# Patient Record
Sex: FEMALE | Race: BLACK OR AFRICAN AMERICAN | Employment: FULL TIME | URBAN - METROPOLITAN AREA
[De-identification: names, ages, dates, MRNs, and addresses within clinical notes are randomized per-mention and may not be internally consistent; named-entity substitution may affect disease eponyms.]

---

## 2017-10-23 ENCOUNTER — HOSPITAL ENCOUNTER (OUTPATIENT)
Dept: MAMMOGRAPHY | Age: 52
Discharge: HOME | End: 2017-10-23
Admitting: INTERNAL MEDICINE

## 2017-10-23 DIAGNOSIS — N63.0 BREAST LUMP: ICD-10-CM

## 2017-10-23 DIAGNOSIS — N63.20 LEFT BREAST LUMP: ICD-10-CM

## 2017-10-23 DIAGNOSIS — N63.0 LUMP OR MASS IN BREAST: ICD-10-CM

## 2018-07-06 ENCOUNTER — OFFICE VISIT (OUTPATIENT)
Dept: DERMATOLOGY | Age: 53
End: 2018-07-06

## 2018-07-06 DIAGNOSIS — L90.5 CICATRIX: ICD-10-CM

## 2018-07-06 DIAGNOSIS — Z78.9 NON-TOBACCO USER: ICD-10-CM

## 2018-07-06 DIAGNOSIS — L70.0 ACNE VULGARIS: Primary | ICD-10-CM

## 2018-07-06 PROCEDURE — 99202 OFFICE O/P NEW SF 15 MIN: CPT | Performed by: DERMATOLOGY

## 2018-07-06 RX ORDER — SODIUM SULFACETAMIDE AND SULFUR 80; 40 MG/ML; MG/ML
SOLUTION TOPICAL
Qty: 473 ML | Refills: 2 | Status: SHIPPED | OUTPATIENT
Start: 2018-07-06

## 2018-07-06 RX ORDER — ONDANSETRON HYDROCHLORIDE 8 MG/1
8 TABLET, FILM COATED ORAL
COMMUNITY
Start: 2017-06-29

## 2018-07-06 RX ORDER — ADAPALENE AND BENZOYL PEROXIDE .1; 2.5 G/100G; G/100G
GEL TOPICAL
Qty: 45 G | Refills: 1 | Status: SHIPPED | OUTPATIENT
Start: 2018-07-06

## 2018-07-26 ENCOUNTER — TELEPHONE (OUTPATIENT)
Dept: DERMATOLOGY | Age: 53
End: 2018-07-26

## 2018-07-26 NOTE — TELEPHONE ENCOUNTER
Voicemail message received Thursday, 7/25/18    States has not received her mail order script yet. There may be a mixup with her last name. States we had her listed under her maiden name of Merna Maldonado and now it should be [place]. Could we please look into this and let her know.

## 2018-10-19 ENCOUNTER — OFFICE VISIT (OUTPATIENT)
Dept: DERMATOLOGY | Age: 53
End: 2018-10-19
Payer: COMMERCIAL

## 2018-10-19 DIAGNOSIS — L85.3 XEROSIS CUTIS: ICD-10-CM

## 2018-10-19 DIAGNOSIS — L70.0 ACNE VULGARIS: Primary | ICD-10-CM

## 2018-10-19 DIAGNOSIS — D22.9 MULTIPLE BENIGN NEVI: ICD-10-CM

## 2018-10-19 DIAGNOSIS — L91.8 ACROCHORDON: ICD-10-CM

## 2018-10-19 DIAGNOSIS — L81.0 POST-INFLAMMATORY HYPERPIGMENTATION: ICD-10-CM

## 2018-10-19 PROCEDURE — 99213 OFFICE O/P EST LOW 20 MIN: CPT | Performed by: DERMATOLOGY

## 2018-10-19 NOTE — PROGRESS NOTES
[facility]) Dermatology  [place], [place], [address]       Eulalia Kimble  1965    48 y.o. female     Date of Visit: 10/19/2018    Chief Complaint:   Chief Complaint   Patient presents with    Skin Exam    Acne     epiduo- pt states better- does not us med every day - would like a cream to help fade spots    Lesion(s)     lower back, right eyelid        I was asked to see this patient by Dr. Rodriguez ref. provider found. History of Present Illness:  Zoya Treviño is a 48 y.o. female who presents with the chief complaint of the followin. TBSE. Many year history of multiple nevi on the trunk and extremities, all present for many years. Denies new moles. Denies moles changing in size, shape, color. None associated w/ pain, bleeding, pruritus. 2. History of acne to her face. Patient has been applying Epiduo every other night and tolerating well. Has noticed improvement in her acne overall and has been several weeks since a pimple appeared. Does have some asymptomatic discoloration in areas of prior acne bumps on face. Would like a medication to fade these areas. 3.  Complains of a skin tag to right upper eyelid, present for several years, unsure if changed in size. she denies associated burning, bleeding, pain, or itch. 4. Complains of dry rough feeling hands over the last 3 weeks as weather has changed outside. Does not use gentle cleansers or moisturize regularly. Does wash hands frequently. Review of Systems:  Constitutional: Reports general sense of well-being   Skin: No new or changing moles, no history of keloids or hypertrophic scars, no interval of severe sunburns  Heme: No abnormal bruising or bleeding. Past Medical History, Family History, Surgical History, Medications and Allergies reviewed.     Past Skin Hx:   Patient denies past history of melanoma, NMSC, dysplastic nevi, or chronic skin rashes.     PFHx: Denies hx of MM or NMSC     PMHx: papillary thyroid cancer, follicular variant    History reviewed. No pertinent family history. Past Medical History:   Diagnosis Date    Cancer ([address]     thyroid    Thyroid disease      Past Surgical History:   Procedure Laterality Date    HERNIA REPAIR      HYSTERECTOMY      THYROID SURGERY      UMBILICAL HERNIA REPAIR         No Known Allergies  Outpatient Prescriptions Marked as Taking for the 10/19/18 encounter (Office Visit) with [place] Damari, DO   Medication Sig Dispense Refill    calcium carbonate-vitamin D (CALTRATE) 600-400 MG-UNIT TABS per tab Take 1 tablet by mouth      ondansetron (ZOFRAN) 8 MG tablet Take 8 mg by mouth      Carbamide Peroxide (EAR DROPS OT) Place in ear(s)      Adapalene-Benzoyl Peroxide (EPIDUO) 0.1-2.5 % GEL Apply thin layer to face every third night, increasing to every other night as tolerated, moisturize nightly 45 g 1    Sulfacetamide Sodium-Sulfur (SULFACLEANSE 8/4) 8-4 % SUSP Apply Body wash neck down every other day, leave on for 5 minutes before rinsing off 473 mL 2    levothyroxine (SYNTHROID) 75 MCG tablet Take 75 mcg by mouth Daily         Social History:   Social History     Social History    Marital status: Single     Spouse name: N/A    Number of children: N/A    Years of education: N/A     Occupational History    Not on file. Social History Main Topics    Smoking status: Never Smoker    Smokeless tobacco: Never Used    Alcohol use No    Drug use: No    Sexual activity: Not on file     Other Topics Concern    Not on file     Social History Narrative    No narrative on file       Physical Examination     The following were examined and determined to be normal: Psych/Neuro, Scalp/hair, Head/face, Conjunctivae/eyelids, Gums/teeth/lips, Neck, Breast/axilla/chest, Abdomen, Back, RLE and LLE. Groin/buttocks. Genitalia not examined. The following were examined and determined to be abnormal: RUE, LUE and Nails/digits.      -General: NAD, well-nourished,

## 2018-10-29 ENCOUNTER — HOSPITAL ENCOUNTER (OUTPATIENT)
Dept: MAMMOGRAPHY | Age: 53
Discharge: HOME | End: 2018-10-29
Payer: COMMERCIAL

## 2018-10-29 DIAGNOSIS — Z12.31 VISIT FOR SCREENING MAMMOGRAM: ICD-10-CM

## 2018-10-29 PROCEDURE — 77063 BREAST TOMOSYNTHESIS BI: CPT

## 2019-11-21 ENCOUNTER — HOSPITAL ENCOUNTER (OUTPATIENT)
Dept: MAMMOGRAPHY | Age: 54
Discharge: HOME | End: 2019-11-21
Payer: COMMERCIAL

## 2019-11-21 DIAGNOSIS — Z12.31 VISIT FOR SCREENING MAMMOGRAM: ICD-10-CM

## 2019-11-21 PROCEDURE — 77063 BREAST TOMOSYNTHESIS BI: CPT

## 2019-11-26 ENCOUNTER — HOSPITAL ENCOUNTER (OUTPATIENT)
Dept: ULTRASOUND IMAGING | Age: 54
Discharge: HOME | End: 2019-11-26
Payer: COMMERCIAL

## 2019-11-26 DIAGNOSIS — N63.0 BREAST LUMP: ICD-10-CM

## 2019-11-26 PROCEDURE — 76642 ULTRASOUND BREAST LIMITED: CPT

## 2020-06-26 ENCOUNTER — OFFICE VISIT (OUTPATIENT)
Dept: DERMATOLOGY | Age: 55
End: 2020-06-26
Payer: COMMERCIAL

## 2020-06-26 VITALS — TEMPERATURE: 97.7 F

## 2020-06-26 PROCEDURE — 11200 RMVL SKIN TAGS UP TO&INC 15: CPT | Performed by: DERMATOLOGY

## 2020-06-26 NOTE — PATIENT INSTRUCTIONS
Sun Protection Tips    Apply broad spectrum water resistant sunscreen with an SPF of at least 30 to exposed areas of the skin. Dont forget the ears and lips! Remember to reapply sunscreen about every 2 hours and after swimming or sweating. Wear sun protective clothing. Swim shirts (aka. rash guards) are a great idea and negates the need to reapply sunscreen in those areas. Seek the shade whenever possible especially between the hours of 10am and 4pm when the suns rays are the strongest.     Avoid tanning beds          Wear a wide brim hat while in the sun    Wound Care Instructions   Cleanse the wound with mild soapy water daily.  Gently dry the area.  Apply Vaseline or petroleum jelly to the wound using a cotton tipped applicator or Qtip.  Cover with a clean bandage.  Repeat this process until the biopsy site is healed.  You may shower and bathe as usual.       Thanks for your visit! Feel free to call Dr. Myriam Zhang assistantKatie if you have questions, concerns or to schedule your cosmetic procedures.

## 2020-06-26 NOTE — PROGRESS NOTES
Place in ear(s)      Adapalene-Benzoyl Peroxide (EPIDUO) 0.1-2.5 % GEL Apply thin layer to face every third night, increasing to every other night as tolerated, moisturize nightly 45 g 1    Sulfacetamide Sodium-Sulfur (SULFACLEANSE 8/4) 8-4 % SUSP Apply Body wash neck down every other day, leave on for 5 minutes before rinsing off 473 mL 2    levothyroxine (SYNTHROID) 75 MCG tablet Take 75 mcg by mouth Daily         Social History:   Social History     Socioeconomic History    Marital status:      Spouse name: Not on file    Number of children: Not on file    Years of education: Not on file    Highest education level: Not on file   Occupational History    Not on file   Social Needs    Financial resource strain: Not on file    Food insecurity     Worry: Not on file     Inability: Not on file    Transportation needs     Medical: Not on file     Non-medical: Not on file   Tobacco Use    Smoking status: Never Smoker    Smokeless tobacco: Never Used   Substance and Sexual Activity    Alcohol use: No    Drug use: No    Sexual activity: Not on file   Lifestyle    Physical activity     Days per week: Not on file     Minutes per session: Not on file    Stress: Not on file   Relationships    Social connections     Talks on phone: Not on file     Gets together: Not on file     Attends [employer]ism service: Not on file     Active member of club or organization: Not on file     Attends meetings of clubs or organizations: Not on file     Relationship status: Not on file    Intimate partner violence     Fear of current or ex partner: Not on file     Emotionally abused: Not on file     Physically abused: Not on file     Forced sexual activity: Not on file   Other Topics Concern    Not on file   Social History Narrative    Not on file       Physical Examination     The following were examined and determined to be normal: Psych/Neuro.     The following were examined and determined to be abnormal:

## 2020-12-29 ENCOUNTER — HOSPITAL ENCOUNTER (OUTPATIENT)
Dept: MAMMOGRAPHY | Age: 55
Discharge: HOME | End: 2020-12-29
Payer: COMMERCIAL

## 2020-12-29 PROCEDURE — 77063 BREAST TOMOSYNTHESIS BI: CPT

## 2022-01-22 ENCOUNTER — HOSPITAL ENCOUNTER (OUTPATIENT)
Dept: MAMMOGRAPHY | Age: 57
Discharge: HOME | End: 2022-01-22
Payer: COMMERCIAL

## 2022-01-22 VITALS — WEIGHT: 194 LBS | BODY MASS INDEX: 30.45 KG/M2 | HEIGHT: 67 IN

## 2022-01-22 DIAGNOSIS — Z12.31 VISIT FOR SCREENING MAMMOGRAM: ICD-10-CM

## 2022-01-22 PROCEDURE — 77063 BREAST TOMOSYNTHESIS BI: CPT

## 2023-02-27 ENCOUNTER — HOSPITAL ENCOUNTER (OUTPATIENT)
Dept: MAMMOGRAPHY | Age: 58
Discharge: HOME | End: 2023-02-27
Payer: COMMERCIAL

## 2023-02-27 VITALS — WEIGHT: 194 LBS | HEIGHT: 66 IN | BODY MASS INDEX: 31.18 KG/M2

## 2023-02-27 DIAGNOSIS — Z12.31 VISIT FOR SCREENING MAMMOGRAM: ICD-10-CM

## 2023-02-27 PROCEDURE — 77063 BREAST TOMOSYNTHESIS BI: CPT

## 2024-06-08 ENCOUNTER — HOSPITAL ENCOUNTER (EMERGENCY)
Age: 59
Discharge: LEFT BEFORE DISCHARGE | End: 2024-06-08
Attending: EMERGENCY MEDICINE
Payer: COMMERCIAL

## 2024-06-08 ENCOUNTER — APPOINTMENT (OUTPATIENT)
Dept: GENERAL RADIOLOGY | Age: 59
End: 2024-06-08
Payer: COMMERCIAL

## 2024-06-08 VITALS
BODY MASS INDEX: 31.84 KG/M2 | RESPIRATION RATE: 16 BRPM | HEIGHT: 66 IN | HEART RATE: 66 BPM | DIASTOLIC BLOOD PRESSURE: 98 MMHG | OXYGEN SATURATION: 99 % | TEMPERATURE: 98.3 F | SYSTOLIC BLOOD PRESSURE: 165 MMHG | WEIGHT: 198.1 LBS

## 2024-06-08 DIAGNOSIS — I21.4 NSTEMI (NON-ST ELEVATED MYOCARDIAL INFARCTION) (HCC): Primary | ICD-10-CM

## 2024-06-08 LAB
ALBUMIN SERPL-MCNC: 4.2 G/DL (ref 3.4–5)
ALP SERPL-CCNC: 82 U/L (ref 40–129)
ALT SERPL-CCNC: 41 U/L (ref 10–40)
ANION GAP SERPL CALCULATED.3IONS-SCNC: 11 MMOL/L (ref 3–16)
APTT BLD: 22.2 SEC (ref 22.1–36.4)
AST SERPL-CCNC: 53 U/L (ref 15–37)
BASOPHILS # BLD: 0 K/UL (ref 0–0.2)
BASOPHILS NFR BLD: 0.5 %
BILIRUB DIRECT SERPL-MCNC: <0.2 MG/DL (ref 0–0.3)
BILIRUB INDIRECT SERPL-MCNC: ABNORMAL MG/DL (ref 0–1)
BILIRUB SERPL-MCNC: 0.3 MG/DL (ref 0–1)
BUN SERPL-MCNC: 12 MG/DL (ref 7–20)
CALCIUM SERPL-MCNC: 9.2 MG/DL (ref 8.3–10.6)
CHLORIDE SERPL-SCNC: 105 MMOL/L (ref 99–110)
CO2 SERPL-SCNC: 24 MMOL/L (ref 21–32)
CREAT SERPL-MCNC: 0.8 MG/DL (ref 0.6–1.1)
D-DIMER QUANTITATIVE: <0.27 UG/ML FEU (ref 0–0.6)
DEPRECATED RDW RBC AUTO: 13.6 % (ref 12.4–15.4)
EKG ATRIAL RATE: 59 BPM
EKG DIAGNOSIS: NORMAL
EKG P AXIS: 46 DEGREES
EKG P-R INTERVAL: 194 MS
EKG Q-T INTERVAL: 398 MS
EKG QRS DURATION: 76 MS
EKG QTC CALCULATION (BAZETT): 394 MS
EKG R AXIS: 24 DEGREES
EKG T AXIS: 56 DEGREES
EKG VENTRICULAR RATE: 59 BPM
EOSINOPHIL # BLD: 0.1 K/UL (ref 0–0.6)
EOSINOPHIL NFR BLD: 2.1 %
GFR SERPLBLD CREATININE-BSD FMLA CKD-EPI: 85 ML/MIN/{1.73_M2}
GLUCOSE SERPL-MCNC: 106 MG/DL (ref 70–99)
HCT VFR BLD AUTO: 43.2 % (ref 36–48)
HGB BLD-MCNC: 14.5 G/DL (ref 12–16)
LIPASE SERPL-CCNC: 31 U/L (ref 13–60)
LYMPHOCYTES # BLD: 1.9 K/UL (ref 1–5.1)
LYMPHOCYTES NFR BLD: 57.9 %
MCH RBC QN AUTO: 29.1 PG (ref 26–34)
MCHC RBC AUTO-ENTMCNC: 33.6 G/DL (ref 31–36)
MCV RBC AUTO: 86.6 FL (ref 80–100)
MONOCYTES # BLD: 0.2 K/UL (ref 0–1.3)
MONOCYTES NFR BLD: 6.4 %
NEUTROPHILS # BLD: 1.1 K/UL (ref 1.7–7.7)
NEUTROPHILS NFR BLD: 33.1 %
PLATELET # BLD AUTO: 179 K/UL (ref 135–450)
PMV BLD AUTO: 9.3 FL (ref 5–10.5)
POTASSIUM SERPL-SCNC: 3.9 MMOL/L (ref 3.5–5.1)
PROT SERPL-MCNC: 7.3 G/DL (ref 6.4–8.2)
RBC # BLD AUTO: 4.99 M/UL (ref 4–5.2)
SODIUM SERPL-SCNC: 140 MMOL/L (ref 136–145)
TROPONIN, HIGH SENSITIVITY: 25 NG/L (ref 0–14)
TROPONIN, HIGH SENSITIVITY: 6 NG/L (ref 0–14)
TROPONIN, HIGH SENSITIVITY: 75 NG/L (ref 0–14)
WBC # BLD AUTO: 3.2 K/UL (ref 4–11)

## 2024-06-08 PROCEDURE — 99285 EMERGENCY DEPT VISIT HI MDM: CPT

## 2024-06-08 PROCEDURE — 80076 HEPATIC FUNCTION PANEL: CPT

## 2024-06-08 PROCEDURE — 36415 COLL VENOUS BLD VENIPUNCTURE: CPT

## 2024-06-08 PROCEDURE — 85730 THROMBOPLASTIN TIME PARTIAL: CPT

## 2024-06-08 PROCEDURE — 83690 ASSAY OF LIPASE: CPT

## 2024-06-08 PROCEDURE — 80048 BASIC METABOLIC PNL TOTAL CA: CPT

## 2024-06-08 PROCEDURE — 85379 FIBRIN DEGRADATION QUANT: CPT

## 2024-06-08 PROCEDURE — 6370000000 HC RX 637 (ALT 250 FOR IP): Performed by: EMERGENCY MEDICINE

## 2024-06-08 PROCEDURE — 84484 ASSAY OF TROPONIN QUANT: CPT

## 2024-06-08 PROCEDURE — 71046 X-RAY EXAM CHEST 2 VIEWS: CPT

## 2024-06-08 PROCEDURE — 85025 COMPLETE CBC W/AUTO DIFF WBC: CPT

## 2024-06-08 PROCEDURE — 93005 ELECTROCARDIOGRAM TRACING: CPT | Performed by: EMERGENCY MEDICINE

## 2024-06-08 RX ORDER — ASPIRIN 81 MG/1
324 TABLET, CHEWABLE ORAL ONCE
Status: COMPLETED | OUTPATIENT
Start: 2024-06-08 | End: 2024-06-08

## 2024-06-08 RX ORDER — LABETALOL HYDROCHLORIDE 5 MG/ML
10 INJECTION, SOLUTION INTRAVENOUS ONCE
Status: DISCONTINUED | OUTPATIENT
Start: 2024-06-08 | End: 2024-06-08 | Stop reason: HOSPADM

## 2024-06-08 RX ORDER — HEPARIN SODIUM 10000 [USP'U]/100ML
5-30 INJECTION, SOLUTION INTRAVENOUS CONTINUOUS
Status: DISCONTINUED | OUTPATIENT
Start: 2024-06-08 | End: 2024-06-08 | Stop reason: HOSPADM

## 2024-06-08 RX ORDER — HEPARIN SODIUM 1000 [USP'U]/ML
2000 INJECTION, SOLUTION INTRAVENOUS; SUBCUTANEOUS PRN
Status: DISCONTINUED | OUTPATIENT
Start: 2024-06-08 | End: 2024-06-08 | Stop reason: HOSPADM

## 2024-06-08 RX ORDER — HEPARIN SODIUM 1000 [USP'U]/ML
4000 INJECTION, SOLUTION INTRAVENOUS; SUBCUTANEOUS ONCE
Status: DISCONTINUED | OUTPATIENT
Start: 2024-06-08 | End: 2024-06-08 | Stop reason: HOSPADM

## 2024-06-08 RX ORDER — HEPARIN SODIUM 1000 [USP'U]/ML
4000 INJECTION, SOLUTION INTRAVENOUS; SUBCUTANEOUS PRN
Status: DISCONTINUED | OUTPATIENT
Start: 2024-06-08 | End: 2024-06-08 | Stop reason: HOSPADM

## 2024-06-08 RX ADMIN — ALUMINUM HYDROXIDE, MAGNESIUM HYDROXIDE, AND SIMETHICONE: 1200; 120; 1200 SUSPENSION ORAL at 11:11

## 2024-06-08 RX ADMIN — ASPIRIN 324 MG: 81 TABLET, CHEWABLE ORAL at 12:33

## 2024-06-08 ASSESSMENT — PAIN SCALES - GENERAL: PAINLEVEL_OUTOF10: 1

## 2024-06-08 ASSESSMENT — PAIN DESCRIPTION - LOCATION: LOCATION: CHEST

## 2024-06-08 NOTE — ED NOTES
ED TO INPATIENT SBAR HANDOFF    Patient Name: Shanice Kimble   :  1965  59 y.o.   MRN:  [id number]  Preferred Name  Shanice   ED Room #:  A05/A05-05  Family/Caregiver Present yes   Restraints no   Sitter no   Sepsis Risk Score Sepsis Risk Score: 0.74    Situation  Code Status: No Order No additional code details.    Allergies: Patient has no known allergies.  Weight: Patient Vitals for the past 96 hrs (Last 3 readings):   Weight   24 0922 89.9 kg (198 lb 1.6 oz)     Arrived from: home  Chief Complaint:   Chief Complaint   Patient presents with    Chest Pain     Patient presents to ED with SOB and chest pain for the past few hours     Hospital Problem/Diagnosis:  Active Problems:    * No active hospital problems. *  Resolved Problems:    * No resolved hospital problems. *    Imaging:   XR CHEST (2 VW)   Final Result   1.  No evidence of acute cardiopulmonary disease.       Electronically signed by Hugo Gates MD        Abnormal labs:   Abnormal Labs Reviewed   CBC WITH AUTO DIFFERENTIAL - Abnormal; Notable for the following components:       Result Value    WBC 3.2 (*)     Neutrophils Absolute 1.1 (*)     All other components within normal limits   BASIC METABOLIC PANEL W/ REFLEX TO MG FOR LOW K - Abnormal; Notable for the following components:    Glucose 106 (*)     All other components within normal limits   TROPONIN - Abnormal; Notable for the following components:    Troponin, High Sensitivity 25 (*)     All other components within normal limits   HEPATIC FUNCTION PANEL - Abnormal; Notable for the following components:    ALT 41 (*)     AST 53 (*)     All other components within normal limits   TROPONIN - Abnormal; Notable for the following components:    Troponin, High Sensitivity 75 (*)     All other components within normal limits     Critical values: no     Abnormal Assessment Findings: Elevated troponin, chest pain    Background  History:   Past Medical History:   Diagnosis Date    Cancer  (HCC)     thyroid    Thyroid disease        Assessment    Vitals/MEWS:        Vitals:    06/08/24 0922 06/08/24 1107   BP: (!) 196/109 (!) 197/106   Pulse: 62 62   Resp: 16    Temp: 98.3 °F (36.8 °C)    TempSrc: Oral    SpO2: 100% 100%   Weight: 89.9 kg (198 lb 1.6 oz)    Height: 1.676 m (5' 6\")      FiO2 (%):   O2 Flow Rate:      Cardiac Rhythm:    Pain Assessment:  [x] Verbal [] Gandhi Marquez Scale  Pain Scale: Pain Assessment  Pain Assessment: 0-10  Pain Level: 1  Pain Location: Chest  Last documented pain score (0-10 scale) Pain Level: 1  Last documented pain medication administered: Aspirin 324mg PO  Mental Status: oriented  Orientation Level:    NIH Score:    C-SSRS: Risk of Suicide: No Risk  Bedside swallow:    Iraj Coma Scale (GCS):    Active LDA's:   Peripheral IV 06/08/24 Right Antecubital (Active)   Site Assessment Clean, dry & intact;Clean;Dry;Intact 06/08/24 0943   Line Status Blood return noted;Capped;Flushed 06/08/24 0943                 PO Status: Regular  Pertinent or High Risk Medications/Drips: Heparin  If Yes, please provide details:   Pending Blood Product Administration: no       You may also review the ED PT Care Timeline found under the Summary Nursing Index tab.    Recommendation    Pending orders ED orders complete  Plan for Discharge (if known):   Additional Comments: 20ga IV right AC, Pt independent to restroom.   If any further questions, please call Sending RN at [phone]    Electronically signed by: Electronically signed by Fahad Leger RN on 6/8/2024 at 1:43 PM      Fahad Leger RN  06/08/24 5510

## 2024-06-08 NOTE — ED PROVIDER NOTES
THE [facility]  EMERGENCY DEPARTMENT ENCOUNTER          ATTENDING PHYSICIAN NOTE       Date of evaluation: 6/8/2024    Chief Complaint     Chest Pain (Patient presents to ED with SOB and chest pain for the past few hours)      History of Present Illness     Shanice Kimble is a 59 y.o. female with history of thyroid cancer status post thyroidectomy and subsequent hypothyroidism presenting to the emergency department today for chest pain.  States she awoke this morning with what she describes as an \"air bubble\" in her lower central chest.  It does not radiate elsewhere.  She had recently awoken for the morning and was not exerting herself.  States the sensation is worse laying flat and improved sitting up, denies that it is physically a pain.  She does note it slightly more with taking a deep breath. Does not typically have acid reflux or known history of GERD.  No recent fevers, cough, congestion.  No abdominal pain or nausea/vomiting.  No history of DVT/PE, no recent travel, no recent surgeries, no lower extremity edema or pain that she has noted.  No known cardiac history, no history of hypertension, hyperlipidemia, diabetes.  Did take Tums prior to presenting to the ED with mild relief    Physical Exam     INITIAL VITALS: BP: (!) 196/109, Temp: 98.3 °F (36.8 °C), Pulse: 62, Respirations: 16, SpO2: 100 %     Physical Exam    Nursing note and vitals reviewed.    General:  Adult female, alert and appropriately interactive. In no distress.  HENT: Normocephalic and atraumatic. External ears normal. Nose appears normal externally.  Eyes: Conjunctivae normal. No scleral icterus.  Neck: Neck supple. No tracheal deviation present.   CV: Normal rate. Regular rhythm. S1/S2 auscultated. No murmurs, gallops or rubs.   Pulm: Effort normal on RA. Breath sounds clear to auscultation bilaterally. No wheezes. No rales or rhonchi.   GI: Soft. No distension. No tenderness. No rebound or guarding. No masses. No peritoneal signs.

## 2024-06-08 NOTE — DISCHARGE INSTRUCTIONS
You were seen in the emergency department today for chest pain and discomfort.  Your EKG was reassuring, however your labs show that you are having a small heart attack (NSTEMI).    You wished to go to another emergency department and hospital for additional care without waiting for transfer acceptance from that hospital.  Please go directly there for ongoing medical care.  Your records and care notes will be available in the electronic chart.  We have provided a copy of your EKG as well

## 2024-10-05 ENCOUNTER — HOSPITAL ENCOUNTER (OUTPATIENT)
Dept: MAMMOGRAPHY | Age: 59
Discharge: HOME | End: 2024-10-05
Payer: COMMERCIAL

## 2024-10-05 VITALS — WEIGHT: 198 LBS | HEIGHT: 66 IN | BODY MASS INDEX: 31.82 KG/M2

## 2024-10-05 DIAGNOSIS — Z12.31 VISIT FOR SCREENING MAMMOGRAM: ICD-10-CM

## 2024-10-05 PROCEDURE — 77063 BREAST TOMOSYNTHESIS BI: CPT
